# Patient Record
Sex: FEMALE | Race: OTHER | NOT HISPANIC OR LATINO
[De-identification: names, ages, dates, MRNs, and addresses within clinical notes are randomized per-mention and may not be internally consistent; named-entity substitution may affect disease eponyms.]

---

## 2021-05-12 PROBLEM — Z00.00 ENCOUNTER FOR PREVENTIVE HEALTH EXAMINATION: Status: ACTIVE | Noted: 2021-05-12

## 2021-05-19 ENCOUNTER — APPOINTMENT (OUTPATIENT)
Dept: OBGYN | Facility: CLINIC | Age: 51
End: 2021-05-19
Payer: MEDICAID

## 2021-05-19 VITALS
WEIGHT: 139 LBS | SYSTOLIC BLOOD PRESSURE: 124 MMHG | DIASTOLIC BLOOD PRESSURE: 68 MMHG | HEIGHT: 62 IN | BODY MASS INDEX: 25.58 KG/M2

## 2021-05-19 DIAGNOSIS — Z81.8 FAMILY HISTORY OF OTHER MENTAL AND BEHAVIORAL DISORDERS: ICD-10-CM

## 2021-05-19 DIAGNOSIS — Z82.49 FAMILY HISTORY OF ISCHEMIC HEART DISEASE AND OTHER DISEASES OF THE CIRCULATORY SYSTEM: ICD-10-CM

## 2021-05-19 DIAGNOSIS — Z01.419 ENCOUNTER FOR GYNECOLOGICAL EXAMINATION (GENERAL) (ROUTINE) W/OUT ABNORMAL FINDINGS: ICD-10-CM

## 2021-05-19 PROCEDURE — 99386 PREV VISIT NEW AGE 40-64: CPT

## 2021-05-19 RX ORDER — HYDROCHLOROTHIAZIDE 12.5 MG/1
TABLET ORAL
Refills: 0 | Status: ACTIVE | COMMUNITY

## 2021-05-19 NOTE — PHYSICAL EXAM
[Appropriately responsive] : appropriately responsive [Alert] : alert [No Acute Distress] : no acute distress [Soft] : soft [Non-tender] : non-tender [Non-distended] : non-distended [No HSM] : No HSM [No Lesions] : no lesions [No Mass] : no mass [Oriented x3] : oriented x3 [Examination Of The Breasts] : a normal appearance [No Masses] : no breast masses were palpable [Labia Majora] : normal [Labia Minora] : normal [Normal] : normal [Uterine Adnexae] : normal [Breast Reconstruction Right] : breast reconstruction [Breast Reconstruction Left] : breast reconstruction

## 2021-05-20 LAB — HPV HIGH+LOW RISK DNA PNL CVX: NOT DETECTED

## 2021-05-24 LAB — CYTOLOGY CVX/VAG DOC THIN PREP: NORMAL

## 2021-07-26 ENCOUNTER — APPOINTMENT (OUTPATIENT)
Dept: OBGYN | Facility: CLINIC | Age: 51
End: 2021-07-26
Payer: MEDICAID

## 2021-07-26 VITALS
HEIGHT: 62 IN | SYSTOLIC BLOOD PRESSURE: 112 MMHG | DIASTOLIC BLOOD PRESSURE: 68 MMHG | WEIGHT: 137 LBS | BODY MASS INDEX: 25.21 KG/M2

## 2021-07-26 PROCEDURE — 58100 BIOPSY OF UTERUS LINING: CPT

## 2021-07-26 NOTE — COUNSELING
[Nutrition/ Exercise/ Weight Management] : nutrition, exercise, weight management [Vitamins/Supplements] : vitamins/supplements [Breast Self Exam] : breast self exam [Medication Management] : medication management [STD (testing, results, tx)] : STD (testing, results, tx) [FreeTextEntry2] : vulvar Hygiene

## 2021-07-26 NOTE — DISCUSSION/SUMMARY
[FreeTextEntry1] : - Pap/HPV obtained today\par - STI testing offered; declined recently tested\par - Mammo/Sono done; records scanned in\par - Pt has appt for Colonoscopy \par \par I discussed the imaging findings and her symptoms which warrant surgical intervention.  I explained that the heavy bleeding is likely related to fibroids or other intrauterine pathology.  We discussed management options for this which include medical vs surgical management.  As far as medical management, we discussed hormonal vs non-hormonal options including the oral contraceptive pill, LNG IUD, Nuva Ring and TXA.  For surgical management we discussed Hysteroscopy with possible endometrial ablation.  We discussed myomectomy versus hysterectomy.  We also discussed type of hysterectomy, total versus supracervical.  We discussed that there is no proven medical benefit to keeping her cervix.  The patient desires definitive management.\par \par I recommend total hysterectomy with bilateral salpingectomy via a minimally invasive approach.  The patient agrees to TLH, b/l salpingectomy. We discussed retaining her ovaries to avoid surgical menopause. We discussed the indications, risks, benefits and alternatives.  We discussed conversion to laparotomy, bleeding, infection, and injury to nearby organs at length.  All questions answered to her apparent satisfaction and pre/post operative instructions and expectations discussed.  \par \par Pt understands that due to surgical history, she is at increased risk of scar tissue, bleeding, and conversion to laparotomy.  Has no objection to blood transfusion.\par \par Will book for TLH, b/l salpingectomy.  Will return for endometrial biopsy.\par \par sono from [place] scanned in\par \par

## 2021-07-26 NOTE — PROCEDURE
[Endometrial Biopsy] : Endometrial biopsy [Time out performed] : Pre-procedure time out performed.  Patient's name, date of birth and procedure confirmed. [Consent Obtained] : Consent obtained [Irregular Bleeding] : irregular uterine bleeding [Risks] : risks [Benefits] : benefits [Alternatives] : alternatives [Patient] : patient [Infection] : infection [Bleeding] : bleeding [Allergic Reaction] : allergic reaction [Uterine Perforation] : uterine perforation [Pain] : pain [Negative] : negative pregnancy test [Betadine] : Betadine [None] : none [Tenaculum] : Tenaculum [Easy Passage] : Easy passage [Anteverted] : anteverted [Sent to Pathology] : placed in buffered formalin and sent for pathology [Tolerated Well] : Patient tolerated the procedure well [No Complications] : No complications

## 2021-07-26 NOTE — HISTORY OF PRESENT ILLNESS
[Currently Active] : currently active [Men] : men [Vaginal] : vaginal [No] : No [Patient would like to be screened for STIs] : Patient would like to be screened for STIs [FreeTextEntry1] : [place] : [place]\par [place]\par [id number]\par \par 50yo  lmp 21 here for establishment of care.  Reports painful, heavy menses for the past 2 years.\par \par LTCS x 2

## 2021-07-27 ENCOUNTER — NON-APPOINTMENT (OUTPATIENT)
Age: 51
End: 2021-07-27

## 2021-08-02 LAB — CORE LAB BIOPSY: NORMAL

## 2022-05-03 ENCOUNTER — APPOINTMENT (OUTPATIENT)
Dept: OBGYN | Facility: CLINIC | Age: 52
End: 2022-05-03
Payer: MEDICAID

## 2022-05-03 VITALS
DIASTOLIC BLOOD PRESSURE: 75 MMHG | HEIGHT: 62 IN | WEIGHT: 144 LBS | BODY MASS INDEX: 26.5 KG/M2 | SYSTOLIC BLOOD PRESSURE: 130 MMHG

## 2022-05-03 DIAGNOSIS — N93.9 ABNORMAL UTERINE AND VAGINAL BLEEDING, UNSPECIFIED: ICD-10-CM

## 2022-05-03 PROCEDURE — 99213 OFFICE O/P EST LOW 20 MIN: CPT

## 2022-05-03 NOTE — DISCUSSION/SUMMARY
[FreeTextEntry1] : - Pt was lost to follow up; still desires hysterectomy\par - currently taking aygestin 5mg daily and pt reports not completely controlled; recommend two tabs daily\par - referral given for sono and referred to Dr. Rivera for surgical mgmt

## 2022-05-03 NOTE — HISTORY OF PRESENT ILLNESS
[FreeTextEntry1] : [place] \par [place]\par [place]\par #[id number]\par \par 51yo P2 LMP 3/7/22 here w/ AUB.  Seen in urgent care last week and started on Aygestin.

## 2022-06-20 ENCOUNTER — OUTPATIENT (OUTPATIENT)
Dept: OUTPATIENT SERVICES | Facility: HOSPITAL | Age: 52
LOS: 1 days | End: 2022-06-20

## 2022-06-20 ENCOUNTER — RESULT REVIEW (OUTPATIENT)
Age: 52
End: 2022-06-20

## 2022-06-20 ENCOUNTER — APPOINTMENT (OUTPATIENT)
Dept: ULTRASOUND IMAGING | Facility: CLINIC | Age: 52
End: 2022-06-20
Payer: MEDICAID

## 2022-06-20 PROCEDURE — 76830 TRANSVAGINAL US NON-OB: CPT | Mod: 26

## 2022-06-24 ENCOUNTER — NON-APPOINTMENT (OUTPATIENT)
Age: 52
End: 2022-06-24

## 2022-06-28 ENCOUNTER — NON-APPOINTMENT (OUTPATIENT)
Age: 52
End: 2022-06-28

## 2022-07-01 ENCOUNTER — OUTPATIENT (OUTPATIENT)
Dept: OUTPATIENT SERVICES | Facility: HOSPITAL | Age: 52
LOS: 1 days | End: 2022-07-01
Payer: MEDICAID

## 2022-07-01 ENCOUNTER — APPOINTMENT (OUTPATIENT)
Dept: MRI IMAGING | Facility: HOSPITAL | Age: 52
End: 2022-07-01

## 2022-07-01 PROCEDURE — A9585: CPT

## 2022-07-01 PROCEDURE — 72197 MRI PELVIS W/O & W/DYE: CPT | Mod: 26

## 2022-07-01 PROCEDURE — 72197 MRI PELVIS W/O & W/DYE: CPT

## 2022-07-14 ENCOUNTER — APPOINTMENT (OUTPATIENT)
Dept: OBGYN | Facility: CLINIC | Age: 52
End: 2022-07-14

## 2022-07-14 VITALS
BODY MASS INDEX: 26.5 KG/M2 | HEIGHT: 62 IN | DIASTOLIC BLOOD PRESSURE: 97 MMHG | HEART RATE: 94 BPM | SYSTOLIC BLOOD PRESSURE: 147 MMHG | WEIGHT: 144 LBS

## 2022-07-14 DIAGNOSIS — D21.9 BENIGN NEOPLASM OF CONNECTIVE AND OTHER SOFT TISSUE, UNSPECIFIED: ICD-10-CM

## 2022-07-14 DIAGNOSIS — I10 ESSENTIAL (PRIMARY) HYPERTENSION: ICD-10-CM

## 2022-07-14 DIAGNOSIS — N80.0 ENDOMETRIOSIS OF UTERUS: ICD-10-CM

## 2022-07-14 PROCEDURE — 99215 OFFICE O/P EST HI 40 MIN: CPT

## 2022-07-14 RX ORDER — NORETHINDRONE ACETATE 5 MG/1
5 TABLET ORAL DAILY
Qty: 60 | Refills: 0 | Status: ACTIVE | COMMUNITY
Start: 2022-05-03 | End: 1900-01-01

## 2022-07-14 NOTE — DISCUSSION/SUMMARY
[FreeTextEntry1] : 53 yo English speaking P2 w/symptomatic adenomyomas.\par MRI images reviewed.\par Pt interested in definitive surgical intervention. \par History of htn,  x 2, abdominoplasty, lap kenji.\par HMB currently managed w/norethindrone acetate\par \par Spoke to pt about her diagnosis and about options for management. \par Spoke about challenges of surgery given her prior surgical procedures.\par EM bx  benign.\par \par Letter to Dr. Ortega\par \par \par Plan\par Schedule TLH\par Med clearance\par RTO for preop chat\par

## 2022-07-14 NOTE — PHYSICAL EXAM
[Labia Majora] : normal [Normal] : normal [FreeTextEntry7] : S/p abdominoplasty. Abd firm [FreeTextEntry6] : ~10 wks, exam limited by abdominoplasty

## 2022-10-20 ENCOUNTER — NON-APPOINTMENT (OUTPATIENT)
Age: 52
End: 2022-10-20

## 2022-10-27 ENCOUNTER — APPOINTMENT (OUTPATIENT)
Dept: OBGYN | Facility: CLINIC | Age: 52
End: 2022-10-27

## 2022-11-07 ENCOUNTER — APPOINTMENT (OUTPATIENT)
Dept: OBGYN | Facility: HOSPITAL | Age: 52
End: 2022-11-07

## 2022-11-29 ENCOUNTER — APPOINTMENT (OUTPATIENT)
Dept: OBGYN | Facility: CLINIC | Age: 52
End: 2022-11-29

## 2022-12-20 ENCOUNTER — APPOINTMENT (OUTPATIENT)
Dept: OBGYN | Facility: CLINIC | Age: 52
End: 2022-12-20